# Patient Record
(demographics unavailable — no encounter records)

---

## 2025-02-04 NOTE — HISTORY OF PRESENT ILLNESS
[FreeTextEntry1] : 54-year-old man with history of diabetes here for CPE Last CPE about 4.5 years ago [de-identified] : DIABETES: The patient has been diagnosed with diabetes since 2010.   Hypoglycemic episodes are not known.  Side effects of the medications include none. Compliance with the medical regimen has been GOOD.   Since the patient's last visit with me, the weight has CHANGED BY -9

## 2025-02-04 NOTE — HEALTH RISK ASSESSMENT
[No] : In the past 12 months have you used drugs other than those required for medical reasons? No [No falls in past year] : Patient reported no falls in the past year [Yes] : Yes [2 - 4 times a month (2 pts)] : 2-4 times a month (2 points) [1 or 2 (0 pts)] : 1 or 2 (0 points) [Never (0 pts)] : Never (0 points) [Audit-CScore] : 2 [Never] : Never [HIV test declined] : HIV test declined [HIVDate] : 02/25

## 2025-02-04 NOTE — REVIEW OF SYSTEMS
[Joint Pain] : joint pain [Negative] : Respiratory [Fever] : no fever [Chills] : no chills [Hearing Loss] : no hearing loss [Chest Pain] : no chest pain [Palpitations] : no palpitations [Abdominal Pain] : no abdominal pain [Nausea] : no nausea [Vomiting] : no vomiting [Heartburn] : no heartburn [Dysuria] : no dysuria [Hematuria] : no hematuria [Muscle Weakness] : no muscle weakness [Skin Rash] : no skin rash [Headache] : no headache [Dizziness] : no dizziness [Anxiety] : no anxiety [Depression] : no depression [Easy Bleeding] : no easy bleeding [FreeTextEntry3] : last eye exam8 months ago

## 2025-02-04 NOTE — ASSESSMENT
Mail box full unable to l/m on v/m for b/p check   [FreeTextEntry1] : Diabetes mellitus without mention of complication, type II or unspecified type, not stated as uncontrolled - Check A1c and glu. Cont meds per Salomón. Urged ophtho and foot care. Still not on statin. Decision left to Salomón as I rarely see him. Last A1c NOT AT GOAL 9.0 with A1c averaging 9.2 over the last 7 years.  He is unaware of what his last A1c was with Dr. Lorenzana.  Heart murmur- loud possible opening snap at [facility] not heard by me earlier.  Never went for echo  Ulcerative (chronic) enterocolitis - Has gi f/u. No Gi c/o and no bleeding/pain. I urged him to  followup with GI Dr. Abdi regarding a repeat colonoscopy AGAIN.  Patient refuses flu vaccine I recommended and understands the risks of that decision. Declines HIV testing

## 2025-03-10 NOTE — PHYSICAL EXAM
[Alert] : alert [Sclera] : the sclera and conjunctiva were normal [Normal Appearance] : the appearance of the neck was normal [Abdomen Soft] : soft [Abnormal Walk] : normal gait [Normal Color / Pigmentation] : normal skin color and pigmentation [No Focal Deficits] : no focal deficits [de-identified] : Deferred pending colonoscopy

## 2025-03-10 NOTE — PHYSICAL EXAM
[Alert] : alert [Sclera] : the sclera and conjunctiva were normal [Normal Appearance] : the appearance of the neck was normal [Abdomen Soft] : soft [Abnormal Walk] : normal gait [Normal Color / Pigmentation] : normal skin color and pigmentation [No Focal Deficits] : no focal deficits [de-identified] : Deferred pending colonoscopy

## 2025-03-10 NOTE — ASSESSMENT
[FreeTextEntry1] : Ulcerative colitis: seemingkly in remission / labs thru PMD reviewed.  Thang check CRP.  Colonoscopy scheduled.  ContinueLialda 2 tabs daily  Pertinent available records reviewed Risks of the procedure including but not limited to bleeding / perforation / infection / anesthesia complication / missed polyp or lesion explained to the  patient . The patient expressed understanding and a desire to proceed with the procedure.  Risk of not doing procedure includes but is not limited to missed or delayed diagnosis of gastrointestinal pathology. A consultation note was provided to the referring provider

## 2025-03-10 NOTE — CONSULT LETTER
[Dear  ___] : Dear  [unfilled], [Consult Letter:] : I had the pleasure of evaluating your patient, [unfilled]. [Please see my note below.] : Please see my note below. [Consult Closing:] : Thank you very much for allowing me to participate in the care of this patient.  If you have any questions, please do not hesitate to contact me. [Sincerely,] : Sincerely, [FreeTextEntry3] : Madhav Abdi MD tel: [phone] fax: [phone]

## 2025-03-10 NOTE — CONSULT LETTER
[Dear  ___] : Dear  [unfilled], [Consult Letter:] : I had the pleasure of evaluating your patient, [unfilled]. [Please see my note below.] : Please see my note below. [Consult Closing:] : Thank you very much for allowing me to participate in the care of this patient.  If you have any questions, please do not hesitate to contact me. [Sincerely,] : Sincerely, [FreeTextEntry3] : Madhav Abdi MD tel: [phone] fax: [phone]

## 2025-03-10 NOTE — HISTORY OF PRESENT ILLNESS
[FreeTextEntry1] : SURYA GATICA  is being evaluated at the request of Dr. Carr for an opinion re: ulcerative colitis. Has done well since last colonoscopy ~  10 years ago. No flares.  Denies nausea, vomiting, fever, chills, diarrhea, constipation, melena, hematemesis, BRBPRT  - Colonoscopy 1/2014: proctitis. Follow up recommended in 2 years.  Patient did not follow up

## 2025-03-14 NOTE — HISTORY OF PRESENT ILLNESS
[FreeTextEntry1] : Diagnosed 2010  Colonoscopy-- May 2025  PMH:  Ulcerative enterocolitis, DKA 2023 2/2 suspected food poisoning/vomiting Last eye exam: UTD < 1 yr FMH: Family h/o autoimmune diseases   Social: Lives with wife    Home diabetes medications: Janumet XR  mg BID Lantus 39 units am   Medications previously tried: Jentadueto  Metformin   SMBG/CGM: Willi 2  162 mg/dl, %39.6% Time in Ranges   >250  12%  >180  23%    63% <70  2%  < 54 0%   Hypoglycemia: Less often since lowering the Lantus dose  Severe hypoglycemia: 1 severe low this past summer  Has glucagon: no Carries a source of glucose with him    Dietary patterns: Saw RD on Wed this week Stopped coffee  Stopped eating every 3 hrs in past 2 days --previously doing to avoid low BG ETOH: Occasional    Physical activity: Pel[employer], strength training, treadmill     Labs: (2/5/25) A1C 11.2%  Creatinine: 1.05 GFR: 84 TSH 3.09 Total cholesterol:  185 Triglycerides: 61 LDL: 96 HDL: 77

## 2025-03-14 NOTE — ASSESSMENT
[FreeTextEntry1] :   Willi report reviewed with patient, TIR 63%, decreased glucose variability since seeing RD Switch to Willi 3 PLUS with next refill Reviewed hypoglycemia management Glucagon e-prescribed  Labs today--to assess for MERISSA  Will call patient with results.

## 2025-03-14 NOTE — PHYSICAL EXAM
[Alert] : alert [Well Nourished] : well nourished [Clear to Auscultation] : lungs were clear to auscultation bilaterally [Normal S1, S2] : normal S1 and S2 [Normal Affect] : the affect was normal [Normal Mood] : the mood was normal

## 2025-07-22 NOTE — HISTORY OF PRESENT ILLNESS
[FreeTextEntry1] : Diagnosed 2010  Colonoscopy-- May 2025  PMH:  Ulcerative enterocolitis, DKA 2023 2/2 suspected food poisoning/vomiting Last eye exam: UTD < 1 yr FMH: Family h/o autoimmune diseases   Social: Lives with wife    Home diabetes medications: Janumet XR  mg BID Lantus 36 units am Novolog 3-7 units    Medications previously tried: Jentadueto  Metformin   SMBG/CGM: Willi 2/3 131 mg/dl, %29.3% Time in Ranges   >250  1%  >180  10%    84% <70  4%  < 54 1%   Hypoglycemia: Occasional.  Severe hypoglycemia: 1 severe low this past summer  Has glucagon: yes Carries a source of glucose with him    Dietary patterns: Seeing RD Stopped coffee  ETOH: Occasional    Physical activity: Pel[employer], strength training, treadmill --less in recent weeks    Labs: (2/5/25) A1C 11.2%  Creatinine: 1.05 GFR: 84 TSH 3.09 Total cholesterol:  185 Triglycerides: 61 LDL: 96 HDL: 77

## 2025-07-22 NOTE — ASSESSMENT
[FreeTextEntry1] :  Willi reviewed, TIR 84%  Contact for Endo provided--Dr. Mcleod  Discussed action of Lantus--basal coverage, not responsible for mealtime Counseled re Arlyn, Peak flow: [phone]--Will  sample in the [place] location tomorrow  Check labs today.